# Patient Record
Sex: MALE | Race: WHITE | NOT HISPANIC OR LATINO | Employment: PART TIME | URBAN - METROPOLITAN AREA
[De-identification: names, ages, dates, MRNs, and addresses within clinical notes are randomized per-mention and may not be internally consistent; named-entity substitution may affect disease eponyms.]

---

## 2024-09-23 ENCOUNTER — HOSPITAL ENCOUNTER (EMERGENCY)
Facility: HOSPITAL | Age: 64
Discharge: HOME | End: 2024-09-23
Attending: EMERGENCY MEDICINE
Payer: COMMERCIAL

## 2024-09-23 VITALS
OXYGEN SATURATION: 98 % | BODY MASS INDEX: 27.4 KG/M2 | TEMPERATURE: 98 F | DIASTOLIC BLOOD PRESSURE: 74 MMHG | RESPIRATION RATE: 21 BRPM | HEIGHT: 69 IN | HEART RATE: 78 BPM | SYSTOLIC BLOOD PRESSURE: 132 MMHG | WEIGHT: 185 LBS

## 2024-09-23 DIAGNOSIS — N18.30 STAGE 3 CHRONIC KIDNEY DISEASE, UNSPECIFIED WHETHER STAGE 3A OR 3B CKD: ICD-10-CM

## 2024-09-23 DIAGNOSIS — N20.0 NEPHROLITHIASIS: Primary | ICD-10-CM

## 2024-09-23 DIAGNOSIS — R82.71 BACTERIURIA: ICD-10-CM

## 2024-09-23 DIAGNOSIS — N20.1 CALCULUS OF URETEROVESICAL JUNCTION (UVJ): ICD-10-CM

## 2024-09-23 LAB
ALBUMIN SERPL BCP-MCNC: 4.2 G/DL (ref 3.5–5.2)
ALP SERPL-CCNC: 79 U/L (ref 55–135)
ALT SERPL W/O P-5'-P-CCNC: 12 U/L (ref 10–44)
ANION GAP SERPL CALC-SCNC: 12 MMOL/L (ref 8–16)
AST SERPL-CCNC: 19 U/L (ref 10–40)
BACTERIA #/AREA URNS HPF: ABNORMAL /HPF
BASOPHILS # BLD AUTO: 0.05 K/UL (ref 0–0.2)
BASOPHILS NFR BLD: 0.4 % (ref 0–1.9)
BILIRUB SERPL-MCNC: 0.7 MG/DL (ref 0.1–1)
BILIRUB UR QL STRIP: NEGATIVE
BUN SERPL-MCNC: 23 MG/DL (ref 8–23)
CALCIUM SERPL-MCNC: 9.7 MG/DL (ref 8.7–10.5)
CHLORIDE SERPL-SCNC: 112 MMOL/L (ref 95–110)
CLARITY UR: CLEAR
CO2 SERPL-SCNC: 16 MMOL/L (ref 23–29)
COLOR UR: YELLOW
CREAT SERPL-MCNC: 2 MG/DL (ref 0.5–1.4)
DIFFERENTIAL METHOD BLD: ABNORMAL
EOSINOPHIL # BLD AUTO: 0 K/UL (ref 0–0.5)
EOSINOPHIL NFR BLD: 0.2 % (ref 0–8)
ERYTHROCYTE [DISTWIDTH] IN BLOOD BY AUTOMATED COUNT: 12.2 % (ref 11.5–14.5)
EST. GFR  (NO RACE VARIABLE): 36.1 ML/MIN/1.73 M^2
GLUCOSE SERPL-MCNC: 161 MG/DL (ref 70–110)
GLUCOSE UR QL STRIP: NEGATIVE
HCT VFR BLD AUTO: 43.1 % (ref 40–54)
HGB BLD-MCNC: 15 G/DL (ref 14–18)
HGB UR QL STRIP: ABNORMAL
IMM GRANULOCYTES # BLD AUTO: 0.04 K/UL (ref 0–0.04)
IMM GRANULOCYTES NFR BLD AUTO: 0.3 % (ref 0–0.5)
KETONES UR QL STRIP: ABNORMAL
LACTATE SERPL-SCNC: 3.3 MMOL/L (ref 0.5–2.2)
LEUKOCYTE ESTERASE UR QL STRIP: NEGATIVE
LIPASE SERPL-CCNC: 14 U/L (ref 4–60)
LYMPHOCYTES # BLD AUTO: 0.9 K/UL (ref 1–4.8)
LYMPHOCYTES NFR BLD: 7.7 % (ref 18–48)
MCH RBC QN AUTO: 31.8 PG (ref 27–31)
MCHC RBC AUTO-ENTMCNC: 34.8 G/DL (ref 32–36)
MCV RBC AUTO: 92 FL (ref 82–98)
MICROSCOPIC COMMENT: ABNORMAL
MONOCYTES # BLD AUTO: 0.4 K/UL (ref 0.3–1)
MONOCYTES NFR BLD: 3.1 % (ref 4–15)
NEUTROPHILS # BLD AUTO: 10.2 K/UL (ref 1.8–7.7)
NEUTROPHILS NFR BLD: 88.3 % (ref 38–73)
NITRITE UR QL STRIP: NEGATIVE
NRBC BLD-RTO: 0 /100 WBC
OHS QRS DURATION: 86 MS
OHS QTC CALCULATION: 465 MS
PH UR STRIP: 6 [PH] (ref 5–8)
PLATELET # BLD AUTO: 179 K/UL (ref 150–450)
PMV BLD AUTO: 9.5 FL (ref 9.2–12.9)
POTASSIUM SERPL-SCNC: 3.7 MMOL/L (ref 3.5–5.1)
PROT SERPL-MCNC: 7.3 G/DL (ref 6–8.4)
PROT UR QL STRIP: NEGATIVE
RBC # BLD AUTO: 4.71 M/UL (ref 4.6–6.2)
RBC #/AREA URNS HPF: 28 /HPF (ref 0–4)
SODIUM SERPL-SCNC: 140 MMOL/L (ref 136–145)
SP GR UR STRIP: 1.02 (ref 1–1.03)
SQUAMOUS #/AREA URNS HPF: 6 /HPF
URN SPEC COLLECT METH UR: ABNORMAL
UROBILINOGEN UR STRIP-ACNC: NEGATIVE EU/DL
WBC # BLD AUTO: 11.51 K/UL (ref 3.9–12.7)
WBC #/AREA URNS HPF: 3 /HPF (ref 0–5)

## 2024-09-23 PROCEDURE — 83605 ASSAY OF LACTIC ACID: CPT | Performed by: EMERGENCY MEDICINE

## 2024-09-23 PROCEDURE — 74176 CT ABD & PELVIS W/O CONTRAST: CPT | Mod: 26,,, | Performed by: RADIOLOGY

## 2024-09-23 PROCEDURE — 74176 CT ABD & PELVIS W/O CONTRAST: CPT | Mod: TC

## 2024-09-23 PROCEDURE — 63600175 PHARM REV CODE 636 W HCPCS: Performed by: EMERGENCY MEDICINE

## 2024-09-23 PROCEDURE — 85025 COMPLETE CBC W/AUTO DIFF WBC: CPT | Performed by: EMERGENCY MEDICINE

## 2024-09-23 PROCEDURE — 81000 URINALYSIS NONAUTO W/SCOPE: CPT | Performed by: EMERGENCY MEDICINE

## 2024-09-23 PROCEDURE — 96374 THER/PROPH/DIAG INJ IV PUSH: CPT

## 2024-09-23 PROCEDURE — 96375 TX/PRO/DX INJ NEW DRUG ADDON: CPT

## 2024-09-23 PROCEDURE — 83690 ASSAY OF LIPASE: CPT | Performed by: EMERGENCY MEDICINE

## 2024-09-23 PROCEDURE — 25000003 PHARM REV CODE 250: Performed by: EMERGENCY MEDICINE

## 2024-09-23 PROCEDURE — 99284 EMERGENCY DEPT VISIT MOD MDM: CPT | Mod: 25

## 2024-09-23 PROCEDURE — 80053 COMPREHEN METABOLIC PANEL: CPT | Performed by: EMERGENCY MEDICINE

## 2024-09-23 RX ORDER — TAMSULOSIN HYDROCHLORIDE 0.4 MG/1
0.4 CAPSULE ORAL DAILY
Qty: 30 CAPSULE | Refills: 0 | Status: SHIPPED | OUTPATIENT
Start: 2024-09-23 | End: 2024-10-23

## 2024-09-23 RX ORDER — HYDROCODONE BITARTRATE AND ACETAMINOPHEN 5; 325 MG/1; MG/1
1 TABLET ORAL EVERY 6 HOURS PRN
Qty: 12 TABLET | Refills: 0 | Status: SHIPPED | OUTPATIENT
Start: 2024-09-23

## 2024-09-23 RX ORDER — KETOROLAC TROMETHAMINE 30 MG/ML
15 INJECTION, SOLUTION INTRAMUSCULAR; INTRAVENOUS
Status: COMPLETED | OUTPATIENT
Start: 2024-09-23 | End: 2024-09-23

## 2024-09-23 RX ORDER — MORPHINE SULFATE 2 MG/ML
4 INJECTION, SOLUTION INTRAMUSCULAR; INTRAVENOUS
Status: COMPLETED | OUTPATIENT
Start: 2024-09-23 | End: 2024-09-23

## 2024-09-23 RX ORDER — ONDANSETRON 4 MG/1
4 TABLET, FILM COATED ORAL EVERY 6 HOURS PRN
Qty: 12 TABLET | Refills: 0 | Status: SHIPPED | OUTPATIENT
Start: 2024-09-23

## 2024-09-23 RX ORDER — ONDANSETRON HYDROCHLORIDE 2 MG/ML
4 INJECTION, SOLUTION INTRAVENOUS
Status: COMPLETED | OUTPATIENT
Start: 2024-09-23 | End: 2024-09-23

## 2024-09-23 RX ORDER — CEFDINIR 300 MG/1
300 CAPSULE ORAL 2 TIMES DAILY
Qty: 14 CAPSULE | Refills: 0 | Status: SHIPPED | OUTPATIENT
Start: 2024-09-23 | End: 2024-09-30

## 2024-09-23 RX ADMIN — KETOROLAC TROMETHAMINE 15 MG: 30 INJECTION, SOLUTION INTRAMUSCULAR at 05:09

## 2024-09-23 RX ADMIN — SODIUM CHLORIDE 1000 ML: 9 INJECTION, SOLUTION INTRAVENOUS at 06:09

## 2024-09-23 RX ADMIN — ONDANSETRON 4 MG: 2 INJECTION INTRAMUSCULAR; INTRAVENOUS at 05:09

## 2024-09-23 RX ADMIN — MORPHINE SULFATE 4 MG: 2 INJECTION, SOLUTION INTRAMUSCULAR; INTRAVENOUS at 05:09

## 2024-09-23 NOTE — ED PROVIDER NOTES
Encounter Date: 9/23/2024       History     Chief Complaint   Patient presents with    LLQ pain      LLQ pain onset abruptly 2 hours ago. Associated n/v      HPI  64-year-old male presenting with left lower quadrant abdominal pain nausea and vomiting started 2 hours prior to arrival.  Felt similar to previous kidney stones.  No black or bloody stools.  No constipation or diarrhea.  No fever chills    Review of patient's allergies indicates:   Allergen Reactions    Sulfa (sulfonamide antibiotics)      No past medical history on file.  No past surgical history on file.  No family history on file.     Review of Systems   All other systems reviewed and are negative.      Physical Exam     Initial Vitals   BP Pulse Resp Temp SpO2   09/23/24 0532 09/23/24 0525 09/23/24 0525 09/23/24 0540 09/23/24 0525   132/74 81 (!) 24 98.1 °F (36.7 °C) 100 %      MAP       --                Physical Exam    Nursing note and vitals reviewed.  Constitutional: He appears well-developed and well-nourished. He appears distressed.   HENT:   Head: Normocephalic and atraumatic.   Eyes: EOM are normal. Right eye exhibits no discharge. Left eye exhibits no discharge.   Neck:   Normal range of motion.  Cardiovascular:  Normal rate and regular rhythm.           Pulmonary/Chest: Breath sounds normal. No stridor. No respiratory distress.   Abdominal: Abdomen is soft.   Mild tenderness in the left lower quadrant with no guarding   Musculoskeletal:         General: Normal range of motion.      Cervical back: Normal range of motion.     Neurological: He is alert and oriented to person, place, and time.   Skin: Skin is warm.   Psychiatric: He has a normal mood and affect.         ED Course   Procedures  Labs Reviewed   CBC W/ AUTO DIFFERENTIAL - Abnormal       Result Value    WBC 11.51      RBC 4.71      Hemoglobin 15.0      Hematocrit 43.1      MCV 92      MCH 31.8 (*)     MCHC 34.8      RDW 12.2      Platelets 179      MPV 9.5      Immature  Granulocytes 0.3      Gran # (ANC) 10.2 (*)     Immature Grans (Abs) 0.04      Lymph # 0.9 (*)     Mono # 0.4      Eos # 0.0      Baso # 0.05      nRBC 0      Gran % 88.3 (*)     Lymph % 7.7 (*)     Mono % 3.1 (*)     Eosinophil % 0.2      Basophil % 0.4      Differential Method Automated     COMPREHENSIVE METABOLIC PANEL - Abnormal    Sodium 140      Potassium 3.7      Chloride 112 (*)     CO2 16 (*)     Glucose 161 (*)     BUN 23      Creatinine 2.0 (*)     Calcium 9.7      Total Protein 7.3      Albumin 4.2      Total Bilirubin 0.7      Alkaline Phosphatase 79      AST 19      ALT 12      eGFR 36.1 (*)     Anion Gap 12     LACTIC ACID, PLASMA - Abnormal    Lactate (Lactic Acid) 3.3 (*)    URINALYSIS, REFLEX TO URINE CULTURE - Abnormal    Specimen UA Urine, Clean Catch      Color, UA Yellow      Appearance, UA Clear      pH, UA 6.0      Specific Gravity, UA 1.025      Protein, UA Negative      Glucose, UA Negative      Ketones, UA 1+ (*)     Bilirubin (UA) Negative      Occult Blood UA 2+ (*)     Nitrite, UA Negative      Urobilinogen, UA Negative      Leukocytes, UA Negative      Narrative:     Preferred Collection Type->Urine, Clean Catch  Specimen Source->Urine   URINALYSIS MICROSCOPIC - Abnormal    RBC, UA 28 (*)     WBC, UA 3      Bacteria Occasional      Squam Epithel, UA 6      Microscopic Comment SEE COMMENT      Narrative:     Preferred Collection Type->Urine, Clean Catch  Specimen Source->Urine   LIPASE    Lipase 14            Imaging Results               CT Renal Stone Study ABD Pelvis WO (Final result)  Result time 09/23/24 06:37:10      Final result by Jasvir Magdaleno MD (09/23/24 06:37:10)                   Impression:      1. Moderate sized hiatal hernia.  2. Mild left-sided hydronephrosis and hydroureter secondary to a small partially obstructing distal left ureteral calculus.  3. Bilateral nonobstructing nephrolithiasis.  4. Diverticulosis  5. Prostatic hypertrophy.  This report was flagged  in Epic as abnormal.      Electronically signed by: Jasvir Magdaleno  Date:    09/23/2024  Time:    06:37               Narrative:    EXAMINATION:  CT RENAL STONE STUDY ABD PELVIS WO    CLINICAL HISTORY:  Nephrolithiasis, symptomatic/complicated;    TECHNIQUE:  Low dose axial images, sagittal and coronal reformations were obtained from the lung bases to the pubic symphysis.  Contrast was not administered.    COMPARISON:  None    FINDINGS:  There is a moderate sized hiatal hernia.  Liver and spleen are normal in size and attenuation.  Previous cholecystectomy.  The pancreas and adrenal glands are unremarkable.    Kidneys are normal in size and attenuation.  There is mild left-sided hydronephrosis with mild left hydroureter secondary to a small partially obstructing 3 mm distal left ureteral calculus.  This lies just proximal to the left UVJ.  There are small bilateral nonobstructing renal calculi measuring 2-5 mm.  No right-sided changes of hydronephrosis.    Air and stool throughout the colon rectum.  Scattered diverticula within the colon.  No mesenteric inflammatory change.  Appendix is normal in caliber.    Bladder is partially distended.  Prostate is enlarged measuring 3.9 cm AP x 4.1 cm transverse.  This results in elevation of the bladder floor.  Seminal vesicles and rectum unremarkable.    No significant mesenteric or retroperitoneal lymphadenopathy.                                       Medications   morphine injection 4 mg (4 mg Intravenous Given 9/23/24 0552)   ondansetron injection 4 mg (4 mg Intravenous Given 9/23/24 0551)   sodium chloride 0.9% bolus 1,000 mL 1,000 mL (0 mLs Intravenous Stopped 9/23/24 0642)   ketorolac injection 15 mg (15 mg Intravenous Given 9/23/24 0551)     Medical Decision Making  See ED course  Symptoms improved with morphine and Toradol Zofran and saline.  Patient was stone, lab abnormalities remarkable for creatinine of 2 however has chronic kidney disease uncertain severity.   Creatinine 1.55 years ago so this could even be near baseline.  Patient has occasional bacteria so started on cefdinir.  Patient was requesting discharge did not want to wait on results.  He was aware of abnormalities and follow up plan but was hurried due to work issues and had to leave    Problems Addressed:  Bacteriuria: complicated acute illness or injury  Calculus of ureterovesical junction (UVJ): complicated acute illness or injury with systemic symptoms that poses a threat to life or bodily functions  Nephrolithiasis: complicated acute illness or injury that poses a threat to life or bodily functions  Stage 3 chronic kidney disease, unspecified whether stage 3a or 3b CKD: chronic illness or injury with severe exacerbation, progression, or side effects of treatment that poses a threat to life or bodily functions    Amount and/or Complexity of Data Reviewed  External Data Reviewed: labs.  Labs: ordered. Decision-making details documented in ED Course.  Radiology: ordered and independent interpretation performed. Decision-making details documented in ED Course.    Risk  Prescription drug management.               ED Course as of 09/23/24 0701   Mon Sep 23, 2024   0530 Temp Source: Oral [LA]   0530 Pulse: 81 [LA]   0530 Resp(!): 24 [LA]   0530 SpO2: 100 % [LA]   0530  LLQ pain onset abruptly 2 hours ago. Associated n/v [LA]   0530 EKG interpreted by me: Sinus rhythm, rate 84, normal [place] and QTC interval, no STEMI, no prior for comparison [LA]   0535 Morphine, Toradol, Zofran, normal saline bolus [LA]   0559 WBC: 11.51 [LA]   0559 Hemoglobin: 15.0 [LA]   0559 Hematocrit: 43.1 [LA]   0559 Platelet Count: 179 [LA]   0559 Resp(!): 21 [LA]   0609 Lactic Acid Level(!): 3.3 [LA]   0630 Lipase: 14 [LA]   0631 CT interpreted by me:  Multiple nephrolith, single left UVJ stone likely culprit [LA]   0633 Creatinine(!): 2.0 [LA]   0633 Lactic Acid Level(!): 3.3 [LA]   0633 Creatinine 1.  Five in 2019.  This patient does not  have normal renal function at baseline.  Creatinine is 2 today, do not think that is an acute issue.  Lactic acid was slightly elevated, uncertain significance.  No fever. [LA]   0633 Patient requesting discharge at this time.  He has not provided urine sample.  He was where he has chronic kidney disease. [LA]   0636 He was requesting discharge.  Told him I will send antibiotic if needed.  Prescribed pain medication nausea medication Flomax otherwise.  Referred to Urology. [LA]   0642 Lactic elevated, pt aware of labs, imaging, plan but requesting to leave. Provided UA will f/u and send rx if needs abx [LA]      ED Course User Index  [LA] Keith Horvath MD                           Clinical Impression:  Final diagnoses:  [N20.0] Nephrolithiasis (Primary)  [N20.1] Calculus of ureterovesical junction (UVJ)  [N18.30] Stage 3 chronic kidney disease, unspecified whether stage 3a or 3b CKD  [R82.71] Bacteriuria          ED Disposition Condition    Discharge Stable          ED Prescriptions       Medication Sig Dispense Start Date End Date Auth. Provider    HYDROcodone-acetaminophen (NORCO) 5-325 mg per tablet Take 1 tablet by mouth every 6 (six) hours as needed for Pain. 12 tablet 9/23/2024 -- Keith Horvath MD    ondansetron (ZOFRAN) 4 MG tablet Take 1 tablet (4 mg total) by mouth every 6 (six) hours as needed for Nausea. 12 tablet 9/23/2024 -- Keith Horvath MD    tamsulosin (FLOMAX) 0.4 mg Cap Take 1 capsule (0.4 mg total) by mouth once daily. 30 capsule 9/23/2024 10/23/2024 Keith Horvath MD    cefdinir (OMNICEF) 300 MG capsule Take 1 capsule (300 mg total) by mouth 2 (two) times daily. for 7 days 14 capsule 9/23/2024 9/30/2024 Keith Horvath MD          Follow-up Information       Follow up With Specialties Details Why Contact [facility] Emergency Dept Emergency Medicine  As needed, If symptoms worsen [address] [facility] [address] [phone]             Alexandru  MD Keith  09/23/24 0701